# Patient Record
Sex: FEMALE | Race: WHITE | URBAN - METROPOLITAN AREA
[De-identification: names, ages, dates, MRNs, and addresses within clinical notes are randomized per-mention and may not be internally consistent; named-entity substitution may affect disease eponyms.]

---

## 2020-06-09 ENCOUNTER — TELEPHONE ENCOUNTER (OUTPATIENT)
Dept: URBAN - METROPOLITAN AREA CLINIC 23 | Facility: CLINIC | Age: 30
End: 2020-06-09

## 2020-07-31 ENCOUNTER — OFFICE VISIT (OUTPATIENT)
Dept: URBAN - METROPOLITAN AREA LAB 3 | Facility: LAB | Age: 30
End: 2020-07-31

## 2022-04-22 ENCOUNTER — WEB ENCOUNTER (OUTPATIENT)
Dept: URBAN - METROPOLITAN AREA CLINIC 23 | Facility: CLINIC | Age: 32
End: 2022-04-22

## 2022-04-28 ENCOUNTER — OFFICE VISIT (OUTPATIENT)
Dept: URBAN - METROPOLITAN AREA CLINIC 23 | Facility: CLINIC | Age: 32
End: 2022-04-28
Payer: COMMERCIAL

## 2022-04-28 ENCOUNTER — WEB ENCOUNTER (OUTPATIENT)
Dept: URBAN - METROPOLITAN AREA CLINIC 23 | Facility: CLINIC | Age: 32
End: 2022-04-28

## 2022-04-28 VITALS
HEART RATE: 87 BPM | DIASTOLIC BLOOD PRESSURE: 84 MMHG | WEIGHT: 184 LBS | SYSTOLIC BLOOD PRESSURE: 121 MMHG | HEIGHT: 63 IN | BODY MASS INDEX: 32.6 KG/M2 | TEMPERATURE: 98 F

## 2022-04-28 DIAGNOSIS — K76.0 FATTY LIVER: ICD-10-CM

## 2022-04-28 DIAGNOSIS — E66.9 OBESITY (BMI 30-39.9): ICD-10-CM

## 2022-04-28 DIAGNOSIS — R17 ELEVATED BILIRUBIN: ICD-10-CM

## 2022-04-28 DIAGNOSIS — E80.4 GILBERT SYNDROME: ICD-10-CM

## 2022-04-28 PROCEDURE — 99244 OFF/OP CNSLTJ NEW/EST MOD 40: CPT | Performed by: STUDENT IN AN ORGANIZED HEALTH CARE EDUCATION/TRAINING PROGRAM

## 2022-04-28 NOTE — HPI-TODAY'S VISIT:
The patient was referred by  for elevated liver enzymes .   A copy of this document is being forwarded to the referring provider.  30 yo F with endometriosis and h/o PCOS here for evaluation of elevated liver enzymes.  She recently had surgery for endometriosis and post op she had labs that showed elevated total bilirubin.  Labs 3/6/22 (done post op) -- reviewed on patient's phone. T bili of 1.6, AST and ALT in 20s RUQ US -- fatty liver, surgically absent gallbladder. Also says she has Sx of bloating, worse after eating.

## 2022-04-30 LAB
A/G RATIO: 2.1
ALBUMIN: 4.8
ALKALINE PHOSPHATASE: 61
ALT (SGPT): 14
AST (SGOT): 18
BILIRUBIN, DIRECT: 0.19
BILIRUBIN, TOTAL: 0.8
BUN/CREATININE RATIO: 16
BUN: 10
CALCIUM: 9.3
CARBON DIOXIDE, TOTAL: 20
CHLORIDE: 99
CREATININE: 0.64
EGFR: 123
GLOBULIN, TOTAL: 2.3
GLUCOSE: 100
POTASSIUM: 4.1
PROTEIN, TOTAL: 7.1
SODIUM: 137

## 2022-05-09 PROBLEM — 162864005: Status: ACTIVE | Noted: 2022-05-09

## 2022-11-29 ENCOUNTER — TELEPHONE ENCOUNTER (OUTPATIENT)
Dept: URBAN - METROPOLITAN AREA CLINIC 23 | Facility: CLINIC | Age: 32
End: 2022-11-29

## 2022-11-29 ENCOUNTER — WEB ENCOUNTER (OUTPATIENT)
Dept: URBAN - METROPOLITAN AREA CLINIC 23 | Facility: CLINIC | Age: 32
End: 2022-11-29

## 2022-12-12 ENCOUNTER — DASHBOARD ENCOUNTERS (OUTPATIENT)
Age: 32
End: 2022-12-12

## 2022-12-12 ENCOUNTER — OFFICE VISIT (OUTPATIENT)
Dept: URBAN - METROPOLITAN AREA CLINIC 12 | Facility: CLINIC | Age: 32
End: 2022-12-12

## 2022-12-12 ENCOUNTER — OFFICE VISIT (OUTPATIENT)
Dept: URBAN - METROPOLITAN AREA CLINIC 12 | Facility: CLINIC | Age: 32
End: 2022-12-12
Payer: COMMERCIAL

## 2022-12-12 VITALS
SYSTOLIC BLOOD PRESSURE: 107 MMHG | HEIGHT: 63 IN | TEMPERATURE: 98.1 F | BODY MASS INDEX: 31.47 KG/M2 | DIASTOLIC BLOOD PRESSURE: 72 MMHG | WEIGHT: 177.6 LBS | HEART RATE: 81 BPM

## 2022-12-12 DIAGNOSIS — E80.4 GILBERT SYNDROME: ICD-10-CM

## 2022-12-12 DIAGNOSIS — K76.0 FATTY LIVER: ICD-10-CM

## 2022-12-12 DIAGNOSIS — R74.8 ELEVATED LIVER ENZYMES: ICD-10-CM

## 2022-12-12 PROBLEM — 197321007: Status: ACTIVE | Noted: 2022-05-09

## 2022-12-12 PROBLEM — 27503000: Status: ACTIVE | Noted: 2022-05-09

## 2022-12-12 PROCEDURE — 99213 OFFICE O/P EST LOW 20 MIN: CPT | Performed by: STUDENT IN AN ORGANIZED HEALTH CARE EDUCATION/TRAINING PROGRAM

## 2022-12-12 NOTE — HPI-TODAY'S VISIT:
The patient was referred by Dr. Torsten Veliz for elevated liver enzymes .   A copy of this document is being forwarded to the referring provider.  yo 32 F with endometriosis and h/o PCOS here for evaluation of elevated liver enzymes.  Labs incidentally noted to have elevated bilirubin after an endometriosis surgery --  Labs 3/6/22 (done post op) -- reviewed on patient's phone. T bili of 1.6, AST and ALT in 20s RUQ US -- fatty liver, surgically absent gallbladder. Also says she has Sx of bloating, worse after eating.  Labs Nov 15, 2022 -- T bili 1.1, D bili 0.2, AST 34, ALT 51.  Otherwise asymptomatic. She is getting IVF soon and wants clearance regarding liver enzymes.

## 2022-12-19 ENCOUNTER — OFFICE VISIT (OUTPATIENT)
Dept: URBAN - METROPOLITAN AREA CLINIC 12 | Facility: CLINIC | Age: 32
End: 2022-12-19